# Patient Record
Sex: MALE | URBAN - METROPOLITAN AREA
[De-identification: names, ages, dates, MRNs, and addresses within clinical notes are randomized per-mention and may not be internally consistent; named-entity substitution may affect disease eponyms.]

---

## 2023-04-15 ENCOUNTER — EMERGENCY (EMERGENCY)
Facility: HOSPITAL | Age: 33
LOS: 1 days | Discharge: HOME | End: 2023-04-15
Attending: STUDENT IN AN ORGANIZED HEALTH CARE EDUCATION/TRAINING PROGRAM
Payer: SELF-PAY

## 2023-04-15 VITALS
SYSTOLIC BLOOD PRESSURE: 126 MMHG | WEIGHT: 160.06 LBS | TEMPERATURE: 98 F | OXYGEN SATURATION: 98 % | HEART RATE: 97 BPM | DIASTOLIC BLOOD PRESSURE: 75 MMHG | RESPIRATION RATE: 976 BRPM

## 2023-04-15 PROCEDURE — 12001 RPR S/N/AX/GEN/TRNK 2.5CM/<: CPT

## 2023-04-15 PROCEDURE — 73590 X-RAY EXAM OF LOWER LEG: CPT | Mod: 26,LT

## 2023-04-15 PROCEDURE — 73590 X-RAY EXAM OF LOWER LEG: CPT

## 2023-04-15 PROCEDURE — 99283 EMERGENCY DEPT VISIT LOW MDM: CPT | Mod: 25

## 2023-04-15 PROCEDURE — 99284 EMERGENCY DEPT VISIT MOD MDM: CPT | Mod: 25

## 2023-04-15 RX ORDER — LIDOCAINE HYDROCHLORIDE AND EPINEPHRINE 10; 10 MG/ML; UG/ML
20 INJECTION, SOLUTION INFILTRATION; PERINEURAL ONCE
Refills: 0 | Status: COMPLETED | OUTPATIENT
Start: 2023-04-15 | End: 2023-04-15

## 2023-04-15 RX ORDER — OXYCODONE AND ACETAMINOPHEN 5; 325 MG/1; MG/1
1 TABLET ORAL ONCE
Refills: 0 | Status: DISCONTINUED | OUTPATIENT
Start: 2023-04-15 | End: 2023-04-15

## 2023-04-15 RX ORDER — IBUPROFEN 200 MG
1 TABLET ORAL
Qty: 20 | Refills: 0
Start: 2023-04-15

## 2023-04-15 RX ADMIN — OXYCODONE AND ACETAMINOPHEN 1 TABLET(S): 5; 325 TABLET ORAL at 13:01

## 2023-04-15 RX ADMIN — OXYCODONE AND ACETAMINOPHEN 1 TABLET(S): 5; 325 TABLET ORAL at 13:31

## 2023-04-15 RX ADMIN — LIDOCAINE HYDROCHLORIDE AND EPINEPHRINE 20 MILLILITER(S): 10; 10 INJECTION, SOLUTION INFILTRATION; PERINEURAL at 13:05

## 2023-04-15 NOTE — ED PROVIDER NOTE - OBJECTIVE STATEMENT
32 year old male with no pertinent medical history presents to ED complaining of injury to left leg, endorsing that a block of concrete fell on it. He denies any other area of injury. Reports last tetanus 5 years ago. NKDA.

## 2023-04-15 NOTE — ED PROVIDER NOTE - CLINICAL SUMMARY MEDICAL DECISION MAKING FREE TEXT BOX
Patient presenting s/p mechanical injury. xray negative for fracture. laceration repaired. given med, return precaution and instructed to f.u pmd

## 2023-04-15 NOTE — ED PROVIDER NOTE - PATIENT PORTAL LINK FT
You can access the FollowMyHealth Patient Portal offered by [facility] by registering at the following website: [email or web address]. By joining [facility]’s FollowMyHealth portal, you will also be able to view your health information using other applications (apps) compatible with our system.

## 2023-04-15 NOTE — ED PROVIDER NOTE - NSFOLLOWUPINSTRUCTIONS_ED_ALL_ED_FT
Please return in 10 days for suture removal      Laceration    WHAT YOU NEED TO KNOW:    A laceration is an injury to the skin and the soft tissue underneath it. Lacerations can happen anywhere on the body.    DISCHARGE INSTRUCTIONS:    Return to the emergency department if:    You have heavy bleeding or bleeding that does not stop after 10 minutes of holding firm, direct pressure over the wound.    Your wound opens up.  Call your doctor if:    You have a fever or chills.    Your laceration is red, warm, or swollen.    You have red streaks on your skin coming from your wound.    You have white or yellow drainage from the wound that smells bad.    You have pain that gets worse, even after treatment.    You have questions or concerns about your condition or care.  Medicines: You may need any of the following:    Prescription pain medicine may be given. Ask your healthcare provider how to take this medicine safely. Some prescription pain medicines contain acetaminophen. Do not take other medicines that contain acetaminophen without talking to your healthcare provider. Too much acetaminophen may cause liver damage. Prescription pain medicine may cause constipation. Ask your healthcare provider how to prevent or treat constipation.    Antibiotics help treat or prevent a bacterial infection.    Take your medicine as directed. Contact your healthcare provider if you think your medicine is not helping or if you have side effects. Tell your provider if you are allergic to any medicine. Keep a list of the medicines, vitamins, and herbs you take. Include the amounts, and when and why you take them. Bring the list or the pill bottles to follow-up visits. Carry your medicine list with you in case of an emergency.  Care for your wound as directed:    Do not get your wound wet until your healthcare provider says it is okay. Do not soak your wound in water. Do not go swimming until your healthcare provider says it is okay. Carefully wash the wound with soap and water. Gently pat the area dry or allow it to air dry.    Change your bandages when they get wet, dirty, or after washing. Apply new, clean bandages as directed. Do not apply elastic bandages or tape too tight. Do not put powders or lotions over your incision.    Apply antibiotic ointment as directed. Your healthcare provider may give you antibiotic ointment to put over your wound if you have stitches. If you have strips of tape over your incision, let them dry up and fall off on their own. If they do not fall off within 14 days, gently remove them. If you have glue over your wound, do not remove or pick at it. If your glue comes off, do not replace it with glue that you have at home.    Check your wound every day for signs of infection, such as swelling, redness, or pus.  Self-care:    Apply ice on your wound for 15 to 20 minutes every hour or as directed. Use an ice pack, or put crushed ice in a plastic bag. Cover it with a towel. Ice helps prevent tissue damage and decreases swelling and pain.    Use a splint as directed. A splint will decrease movement and stress on your wound. It may help it heal faster. A splint may be used for lacerations over joints or areas of your body that bend. Ask your healthcare provider how to apply and remove a splint.    Decrease scarring of your wound by applying ointments as directed. Do not apply ointments until your healthcare provider says it is okay. You may need to wait until your wound is healed. Ask which ointment to buy and how often to use it. After your wound is healed, use sunscreen over the area when you are out in the sun. You should do this for at least 6 months to 1 year after your injury.  Follow up with your doctor as directed: You may need to follow up in 24 to 48 hours to have your wound checked for infection. You will need to return in 3 to 14 days if you have stitches or staples so they can be removed. Care for your wound as directed to prevent infection and help it heal. Write down your questions so you remember to ask them during your visits.

## 2023-04-15 NOTE — ED ADULT NURSE NOTE - BREATHING, MLM
Chief Complaint:  Sleep apnea    HPI:  Rupert Briscoe is a 51 y.o. male who presents today for follow-up multiple chronic medical conditions.  Sleep apnea-previously wearing CPAP about 4 years ago.  He has lost to follow-up after this.  He was diagnosed in [place] and does not have access to his sleep study results.  He would like to be referred for repeat sleep study. He has excessive daytime sleepiness and does not feel rested after a full night's rest. He snores loudly as well.   Sinus congestion- He has chronic congestion, nasal drainage and post nasal drip.  He was previously evaluated by ENT.  Per patient he was told that he needs surgery on his sinuses.  He would like to be referred back to ENT for further evaluation.  Currently taking Claritin and Allegra daily.      ROS:  Constitutional: no fevers, night sweats or unexplained weight loss  Eyes: no vision changes  ENT: no runny nose, ear pain, sore throat  Cardio: no chest pain, palpitations  Pulm: no shortness of breath, wheezing, or cough  GI: no abdominal pain or changes in bowel movements  : no difficulty urinating  MSK: no difficulty ambulating, no joint pain  Neuro: no weakness, dizziness or headache  Psych: no trouble sleeping  Endo: no change in appetite      Past Medical History:   Diagnosis Date   • Colon polyp    • Depression    • Diabetes mellitus (CMS/HCC)    • Diverticulosis    • Hyperlipidemia    • Hypertension    • Migraine    • Pancreatitis       Family History   Adopted: Yes      Social History     Socioeconomic History   • Marital status: Single     Spouse name: Not on file   • Number of children: Not on file   • Years of education: Not on file   • Highest education level: Not on file   Tobacco Use   • Smoking status: Current Every Day Smoker     Packs/day: 0.50   • Smokeless tobacco: Never Used   Substance and Sexual Activity   • Alcohol use: Yes     Comment: soc    • Drug use: No   • Sexual activity: Yes     Partners:  Female     Birth control/protection: None      No Known Allergies   Immunization History   Administered Date(s) Administered   • Pneumococcal Conjugate 13-Valent (PCV13) 11/27/2017   • Tdap 11/27/2017        PE:  Vitals:    07/09/19 1103   BP: 128/68   Pulse: 100   SpO2: 99%        Gen Appearance: NAD  HEENT: Normocephalic, PERRLA, no thyromegaly, trache midline, fluid bilateral TM, no sinus tenderness  Heart: RRR, normal S1 and S2, no murmur  Lungs: CTA b/l, no wheezing, no crackles  Abdomen: Soft, non-tender, non-distended, no guarding and BSx4  MSK: Moves all extremities well, normal gait, no peripheral edema  Pulses: Palpable and equal b/l  Lymph nodes: No palpable lymphadenopathy   Neuro: No focal deficits      Current Outpatient Medications   Medication Sig Dispense Refill   • ACCU-CHEK SONU PLUS test strip Check glucose daily in the morning. 3 each 0   • aspirin 81 MG chewable tablet Chew 81 mg Daily.     • atorvastatin (LIPITOR) 10 MG tablet Take 10 mg by mouth Daily.     • Blood Glucose Monitoring Suppl (ACCU-CHEK SONU PLUS) w/Device kit Check glucose daily in the morning. 1 kit 0   • busPIRone (BUSPAR) 10 MG tablet Take 1 tablet by mouth 2 (Two) Times a Day. 60 tablet 5   • Lancets (ACCU-CHEK MULTICLIX) lancets Check glucose daily in the morning. 3 each 0   • lisinopril (PRINIVIL,ZESTRIL) 10 MG tablet Take 1 tablet by mouth Daily. 90 tablet 1   • metFORMIN (GLUCOPHAGE) 500 MG tablet Take 1 tablet by mouth 2 (Two) Times a Day With Meals. 120 tablet 5   • montelukast (SINGULAIR) 10 MG tablet Take 1 tablet by mouth Every Night. 30 tablet 5   • tiZANidine (ZANAFLEX) 4 MG tablet Take 4 mg by mouth 2 (Two) Times a Day.       Current Facility-Administered Medications   Medication Dose Route Frequency Provider Last Rate Last Dose   • ciprofloxacin (CILOXAN) 0.3 % ophthalmic solution 2 drop  2 drop Left Eye Q4H While Awake Eldon Paniagua MD William was seen today for insomnia.    Diagnoses and all  orders for this visit:    Obstructive sleep apnea  -     Polysomnography 4 or More Parameters; Future  -     Ambulatory Referral to Sleep Medicine  Will repeat sleep study.  Refer to sleep medicine to establish care.  Prior history of sleep apnea diagnosed 4 years ago although, per patient, he is unable to obtain results.  Sinus congestion  -     Ambulatory Referral to ENT (Otolaryngology)  -     montelukast (SINGULAIR) 10 MG tablet; Take 1 tablet by mouth Every Night.  Add on Singulair to help with congestion.  Patient is requesting to be referred to ENT for further evaluation  Essential hypertension  Stable today.  Continue current medication.       No Follow-up on file.      Spontaneous, unlabored and symmetrical

## 2023-04-26 ENCOUNTER — EMERGENCY (EMERGENCY)
Facility: HOSPITAL | Age: 33
LOS: 1 days | Discharge: HOME | End: 2023-04-26
Attending: EMERGENCY MEDICINE
Payer: SELF-PAY

## 2023-04-26 VITALS
RESPIRATION RATE: 17 BRPM | TEMPERATURE: 99 F | HEART RATE: 84 BPM | DIASTOLIC BLOOD PRESSURE: 72 MMHG | HEIGHT: 68 IN | SYSTOLIC BLOOD PRESSURE: 114 MMHG | WEIGHT: 149.03 LBS | OXYGEN SATURATION: 98 %

## 2023-04-26 PROCEDURE — G0463: CPT

## 2023-04-26 PROCEDURE — L9995: CPT

## 2023-04-26 NOTE — ED PROVIDER NOTE - PHYSICAL EXAMINATION
GEN:   comfortable, in no apparent distress, AOx3  EYES:   PERRL, extra-occular movements intact  HEENT:   airway patent, moist mucosal membranes, uvula midline  CV:  RRR, Pulses- Radial: 2+ bilateral and equal  RESP:   clear to auscultation bilaterally, non-labored, speaking in full sentences  ABD:   soft, non tender, no guarding  :   no cva tenderness  MSK:   no musculoskeletal tenderness, 5/5 strength, moving all extremities  SKIN:   dry, no rash, healing wound to LLE with 8 sutures intact.   NEURO:   AOx3, no focal weakness or loss of sensation, gait normal, GCS 15  PSYCH: calm, cooperative, no apparent risk to self and others

## 2023-04-26 NOTE — ED PROVIDER NOTE - OBJECTIVE STATEMENT
33 yo male denies PMH presenting to ED for suture removal. Patient states sutures were placed 10 days ago at [facility] s/p having leg "caught between two pieces of concrete." Wound does not appear infected, no drainage present, denies fevers/chills. Patient c/o pain to left leg since accident, able to ambulate, denies any new or worsening symptoms.

## 2023-04-26 NOTE — ED PROVIDER NOTE - CLINICAL SUMMARY MEDICAL DECISION MAKING FREE TEXT BOX
31 yo male A&OX4 presenting to ED for suture removal from wound to LLE placed 10 days ago. Wound does not appear infected, is healing, no drainage present. Denies fevers/chills, afebrile on arrival. 8 sutures intact in wound on exam, removed without complication. LLE bandaged via guaze and bacitracin at patient request. Patient complains to continued pain LLE since incident, no new or worsening complaints although endorses some continued weakness 2/2 pain, on exam strength intact. LLE neurovascularly intact. Patient agreeable to outpatient ortho follow up. Plan discussed with patient who is agreeable, questions answered, and return precautions provided.

## 2023-04-26 NOTE — ED ADULT NURSE NOTE - CHIEF COMPLAINT
ED Nursing Triage Note (General)   ________________________________    Slim Bueno is a 25 year old Male that presents to triage private car  With history of  Pt states that he fell at work slipping on water falling on his right side.  Pt was not sure if he hit his head or not.  Pt denies LOC.  This happened at 1730 and since then he is reporting dizziness, pain in his head, right arm, eyes with movement and neck reported by patient   Significant symptoms had onset 4 hour(s) ago.    Patient appears alert , in no acute distress., and cooperative behavior.    GCS Eye Opening = 4=Spontaneous  Airway: intact  Breathing noted as Normal  Circulation Normal  Skin:  Normal  Action taken:  Triage to critical care immediately      PRE HOSPITAL PRIOR LIVING SITUATION Significant Other     The patient is a 32y Male complaining of suture/staple removal.

## 2023-04-26 NOTE — ED PROVIDER NOTE - PATIENT PORTAL LINK FT
You can access the FollowMyHealth Patient Portal offered by [facility] by registering at the following website: [email or web address]. By joining [facility]’s FollowMyHealth portal, you will also be able to view your health information using other applications (apps) compatible with our system.

## 2023-04-26 NOTE — ED PROVIDER NOTE - ATTENDING APP SHARED VISIT CONTRIBUTION OF CARE
Suture removal, also having pain in area of tibialis anterior but function remains, will discharge with ortho follow up

## 2023-04-26 NOTE — ED PROVIDER NOTE - NSFOLLOWUPCLINICS_GEN_ALL_ED_FT
[facility]  Orthopedics  [address] [place] [address]  Phone: [phone]  Fax: [phone]

## 2025-05-31 NOTE — ED ADULT TRIAGE NOTE - BP NONINVASIVE SYSTOLIC (MM HG)
Pt to the ED from home with a steady gait with c/o of CP that started this am at 0500 while in moving around in bed. Pt states that the pain is worse with movement and deep breath.        MD at bedside during triage.    126